# Patient Record
Sex: FEMALE | Race: BLACK OR AFRICAN AMERICAN | NOT HISPANIC OR LATINO | URBAN - METROPOLITAN AREA
[De-identification: names, ages, dates, MRNs, and addresses within clinical notes are randomized per-mention and may not be internally consistent; named-entity substitution may affect disease eponyms.]

---

## 2017-05-18 ENCOUNTER — EMERGENCY (EMERGENCY)
Facility: HOSPITAL | Age: 27
LOS: 1 days | Discharge: HOME | End: 2017-05-18
Attending: EMERGENCY MEDICINE | Admitting: EMERGENCY MEDICINE
Payer: COMMERCIAL

## 2017-05-18 VITALS
DIASTOLIC BLOOD PRESSURE: 83 MMHG | SYSTOLIC BLOOD PRESSURE: 138 MMHG | RESPIRATION RATE: 18 BRPM | TEMPERATURE: 99 F | OXYGEN SATURATION: 100 % | HEART RATE: 112 BPM

## 2017-05-18 DIAGNOSIS — R68.84 JAW PAIN: ICD-10-CM

## 2017-05-18 DIAGNOSIS — Z79.1 LONG TERM (CURRENT) USE OF NON-STEROIDAL ANTI-INFLAMMATORIES (NSAID): ICD-10-CM
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
## 2017-05-18 DIAGNOSIS — S60.221A CONTUSION OF RIGHT HAND, INITIAL ENCOUNTER: ICD-10-CM

## 2017-05-18 DIAGNOSIS — M79.641 PAIN IN RIGHT HAND: ICD-10-CM
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
## 2017-05-18 DIAGNOSIS — Z79.899 OTHER LONG TERM (CURRENT) DRUG THERAPY: ICD-10-CM

## 2017-05-18 PROCEDURE — 99284 EMERGENCY DEPT VISIT MOD MDM: CPT | Mod: 25

## 2017-05-18 PROCEDURE — 73130 X-RAY EXAM OF HAND: CPT | Mod: 26,RT

## 2017-05-18 PROCEDURE — 73130 X-RAY EXAM OF HAND: CPT

## 2017-05-18 RX ORDER — IBUPROFEN 200 MG
600 TABLET ORAL ONCE
Qty: 0 | Refills: 0 | Status: COMPLETED | OUTPATIENT
Start: 2017-05-18 | End: 2017-05-18

## 2017-05-18 RX ADMIN — Medication 600 MILLIGRAM(S): at 23:25
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
## 2017-05-18 NOTE — ED PROVIDER NOTE - MEDICAL DECISION MAKING DETAILS
Xray of Hand negative, patient has no clinical signs of jaw fracture. Social work contacted, and she spoke to patient over the phone (Di). Patient to follow up with PMD and social work as outpatient. [facility]

## 2017-05-18 NOTE — ED PROVIDER NOTE - PHYSICAL EXAMINATION
General: NAD nontoxic appearing  HEENT: NCAT EOMI PERRLA swollen lower lip, able to clench teeth and break tongue depressors using jaw as anchor bilaterally   CV: S1 S2 RRR no mrg   Lungs: CTA BL  Abdomen: obese soft ntnd +BS  Ext: right hand swelling and bruise of right 1st metacarpal joint, left hand normal, ROM preserved in both hands on active and passive motion  Neuro: AAOx3 no focal deficits

## 2017-05-18 NOTE — ED PROVIDER NOTE - PLAN OF CARE
Follow up with PMD at earliest convenience. Ibuprofen PRN for pain. Also given your tooth pain, would follow up with your dentist at your earliest convenience
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
